# Patient Record
Sex: FEMALE | Race: OTHER | HISPANIC OR LATINO | URBAN - METROPOLITAN AREA
[De-identification: names, ages, dates, MRNs, and addresses within clinical notes are randomized per-mention and may not be internally consistent; named-entity substitution may affect disease eponyms.]

---

## 2023-12-28 ENCOUNTER — CLINICAL SUPPORT (OUTPATIENT)
Dept: REHABILITATION | Facility: OTHER | Age: 48
End: 2023-12-28
Payer: MEDICAID

## 2023-12-28 DIAGNOSIS — R29.898 SHOULDER WEAKNESS: Primary | ICD-10-CM

## 2023-12-28 DIAGNOSIS — R52 PAIN AGGRAVATED BY LIFTING: ICD-10-CM

## 2023-12-28 DIAGNOSIS — M53.84 DECREASED ROM OF INTERVERTEBRAL DISCS OF THORACIC SPINE: ICD-10-CM

## 2023-12-28 PROCEDURE — 97112 NEUROMUSCULAR REEDUCATION: CPT | Mod: PN

## 2023-12-28 PROCEDURE — 97161 PT EVAL LOW COMPLEX 20 MIN: CPT | Mod: PN

## 2023-12-28 NOTE — PLAN OF CARE
[facility]  Physical Therapy Initial Evaluation    Date: 12/28/2023   Name: Yuly Chung  Clinic Number: [id number]    Therapy Diagnosis:   Encounter Diagnoses   Name Primary?    Shoulder weakness Yes    Pain aggravated by lifting     Decreased ROM of intervertebral discs of thoracic spine      Physician: Mary Jane Rendon, [facility]    Physician Orders: PT Eval and Treat   Medical Diagnosis from Referral: Left shoulder pain [M25.512]   Evaluation Date: 12/28/2023d  Authorization Period Expiration: 12/6/24  Plan of Care Expiration: 3/21/24  Visit # / Visits authorized: 1/ 1   FOTO Follow Up:   FOTO Follow UP:     Precautions: Standard    Time In: 3:40 PM   Time Out: 4:30 PM   Total Appointment Time (timed & untimed codes): 50  minutes    Subjective     Date of onset: August   History of current condition - Yuly reports: insidious onset of L shoulder pain that hurts at rest, raising arm and carrying items. Denies mechanical symptoms, radiating symptoms, weakness in that arm. Is R arm dominant. Has previously done some lifting and working out but not recently d/t L shoulder pain. 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     Falls: none noted     Imaging none on file     Prior Therapy: none noted   Exercise Routine/Sport Participation: none  Social History: unremarkable  Prior Level of Function: independent with ADL's  Current Level of Function: pain in L shoulder with ADL's     Pain:  Current 5/10, worst 10/10, best 2/10   Location: left shoulder  Description: Aching, Dull, and Sharp  Aggravating Factors: Night Time and Lifting  Easing Factors: rest    Pts goals: return to ADL's without L shoulder pain       Medical History:   No past medical history on file.    Surgical History:   Yuly Chung  has no past surgical history on file.    Medications:   Yuly currently has no medications in their medication list.    Allergies:   Review of patient's allergies  indicates:  Not on File     Objective     Posture: mild elevation L shoulder, normal scapular rotation     Shoulder Elevation: delayed setting L, pain at end range, limited upward rotation     Shoulder Active Range of Motion:   Shoulder Right Left   Flexion   170 180   Overhead Reach    T4 T1**   Behind the back Reach   T7 T12**   Scapula Upward Rotation WNL Insufficient       Shoulder Passive Range of Motion:   Shoulder Right Left   Flexion   180 170**   ER at 0   75 75   ER at 90   100 100**   IR   50 50       Strength:   Right Left   Scaption 5/5 3+/5   Shoulder ER at side 5/5 3+/5   Shoulder IR at side  5/5 4+/5   Deltoid 5/5 3+/5       Special Tests:   Right Left   Villalobos- Shawn - +   Neer's - +   Full Can - +   Posterior/Internal Impingement Sign - +      Right Left   Load & Shift - -   Sulcus Sign - -   Apprehension Test - -   Relocation Test  - -      Right Left   Drop Arm Test - -   ER Lag Sign - -   Bear Hug - -     Joint Mobility: normal posterior and inferior glide, no capsular pattern noted     Palpation: Not TTP biceps tendon, mild TTP supraspinatus tendon     Flexibility:   Post Cuff: R - ; L +   Lat: R - ; L -   Pec Minor: R - ; L +        CMS Impairment/Limitation/Restriction for FOTO Intake Survey    Therapist reviewed FOTO scores for Yuly Chung on 12/28/2023.   FOTO documents entered into EPIC - see Media section.    Limitation Score: see media%  Category: Mobility       Treatment     Treatment Time In: 4:05 PM   Treatment Time Out: 4:30 PM   Total Treatment time separate from Evaluation: 25 minutes     Yuly received the treatments listed below:      Neuromuscular re-education activities to improve: Balance, Coordination, Kinesthetic, Sense, and Proprioception for 25 minutes. The following activities were included:  ER Walkout YTB   IT Walkout YTB   Wall Slide  Landmine Press   Open Book       Home Exercises and Patient Education Provided     Education provided:   - Prognosis, activity  modification, goals for therapy, role of therapy for care, exercises/HEP    Written Home Exercises Provided: Yes .  Exercises were reviewed and Yuly was able to demonstrate them prior to the end of the session.   Pt received a written copy of exercises to perform at home. Yuly demonstrated good  understanding of the education provided.     See EMR under patient instructions for exercises given.     Assessment     Yuly is a 48 y.o. female referred to outpatient Physical Therapy with s/s consistent with L shoulder subacromial pain syndrome. She presents with decreased shoulder ROM, shoulder strength, decreased functional abilities. Pt will benefit from skilled outpatient Physical Therapy to address the deficits stated above and in the chart below, provide pt/family education, and to maximize pt's level of independence. Pt prognosis is Excellent.     Plan of care discussed with patient: Yes  Pt's spiritual, cultural and educational needs considered and patient is agreeable to the plan of care and goals as stated below:       Anticipated Barriers for therapy: none       Medical Necessity is demonstrated by the following  History  Co-morbidities and personal factors that may impact the plan of care Co-morbidities:   See PMH     Personal Factors:   no deficits     low   Examination  Body Structures and Functions, activity limitations and participation restrictions that may impact the plan of care Body Regions:   upper extremities    Body Systems:    ROM  strength    Participation Restrictions:   ADL's    Activity limitations:   Learning and applying knowledge  no deficits    General Tasks and Commands  no deficits    Communication  no deficits    Mobility  no deficits    Self care  no deficits    Domestic Life  no deficits    Interactions/Relationships  no deficits    Life Areas  no deficits    Community and Social Life  no deficits         low   Clinical Presentation stable and uncomplicated low   Decision Making/  Complexity Score: low     Goals:  Short Term Goals: 2-4 weeks  1. Pt will be compliant with HEP 50% of prescribed amount.   2. The pt to demo improvement in L shoulder to equal to R pain free   3.  Pt will demo less than 2/10 pain at night in L shoulder    Long Term Goals: 8-12 weeks   Pt will be compliant with % of prescribed amount.   Patient will improve their FOTO limitation score to at least MCID as evidence of clinically significant improvements in their function.  Pt will demo 5/5 RC strength on MMT on L   Pt will demo ability to lift 20# overhead to demonstrate improved functional movement tolerance   The pt will report full participation in ADLs and IADLs without restrictions related to L shoulder.     Plan   Plan of care Certification: 12/28/2023 to 3/21/24.    Outpatient Physical Therapy 1-2 times weekly for 12 weeks to include the following interventions: Manual Therapy, Neuromuscular Re-ed, Patient Education, Therapeutic Activities, and Therapeutic Exercise.     Michael Barnett, PT , DPT

## 2024-01-03 ENCOUNTER — CLINICAL SUPPORT (OUTPATIENT)
Dept: REHABILITATION | Facility: OTHER | Age: 49
End: 2024-01-03
Payer: MEDICAID

## 2024-01-03 DIAGNOSIS — R29.898 SHOULDER WEAKNESS: Primary | ICD-10-CM

## 2024-01-03 DIAGNOSIS — R52 PAIN AGGRAVATED BY LIFTING: ICD-10-CM

## 2024-01-03 PROCEDURE — 97110 THERAPEUTIC EXERCISES: CPT | Mod: PN

## 2024-01-03 NOTE — PROGRESS NOTES
[facility]   Physical Therapy Treatment Note     Name: Yuly Chung  Clinic Number: [id number]    Therapy Diagnosis:   Encounter Diagnoses   Name Primary?    Shoulder weakness Yes    Pain aggravated by lifting      Physician: Mary Jane Rendon FNP-C    Visit Date: 1/3/2024    Physician Orders: PT Eval and Treat   Medical Diagnosis from Referral: Left shoulder pain [M25.512]   Evaluation Date: 12/28/2023d  Authorization Period Expiration: 12/6/24  Plan of Care Expiration: 3/21/24  Visit # / Visits authorized: 1 / 20   FOTO Follow Up  FOTO Follow UP:      Precautions: Standard     Time In: 12:40 PM   Time Out: 1:30 PM   Total Appointment Time (timed & untimed codes): 50  minutes    FOTO 1st:   FOTO 3rd:  FOTO 10th:      SUBJECTIVE     Pt reports: shoulder feels a bit better, more achy at night, otherwise pain is off and on.    She was compliant with home exercise program.  Response to previous treatment: too early   Functional change: too early     Pain: 4/10  Location: left shoulder      OBJECTIVE     Objective Measures updated at progress report unless specified.     Treatment       Yuly received the treatments listed below:      Therapeutic exercises to develop strength, endurance, ROM, and flexibility for 45 minutes including:  Supine Cane Flexion x 30   Sidelying Open book 2 x 10 per direction   ER Walkout YTB 3 x 10   IR Walkout YTB 3 x 10   ER Raise 3 x 10   Wall Slide x 30   Standing I RTB 3 x 10   Sidelying ER 0# 3 x 20     Manual therapy techniques: Joint mobilizations were applied to the: L shoulder for 15 minutes, including:  Posterior glide grade 1-2   Inferior glide grade 1-2   Lat MET   Supine Thoracic HVLA   Assessment         Patient Education and Home Exercises     Home Exercises Provided and Patient Education Provided     Education provided:   - cuff facilitation     Written Home Exercises Provided: Patient instructed to cont prior HEP. Exercises were reviewed and Yuly was  able to demonstrate them prior to the end of the session.  Yuly demonstrated good  understanding of the education provided. See EMR under Patient Instructions for exercises provided during therapy sessions    ASSESSMENT     Yuly with similar presentation to last session, still with cuff weakness and mild lat tightness that improves with exercises. Plan to progress periscapular strength and rotator cuff facilitation as tolerated.     Yuly Is progressing well towards her goals.     Pt prognosis is Good.     Pt will continue to benefit from skilled outpatient physical therapy to address the deficits listed in the problem list box on initial evaluation, provide pt/family education and to maximize pt's level of independence in the home and community environment.     Pt's spiritual, cultural and educational needs considered and pt agreeable to plan of care and goals.     Anticipated barriers to physical therapy: none     Goals:   Short Term Goals: 2-4 weeks  1. Pt will be compliant with HEP 50% of prescribed amount.   2. The pt to demo improvement in L shoulder to equal to R pain free   3.  Pt will demo less than 2/10 pain at night in L shoulder     Long Term Goals: 8-12 weeks   Pt will be compliant with % of prescribed amount.   Patient will improve their FOTO limitation score to at least MCID as evidence of clinically significant improvements in their function.  Pt will demo 5/5 RC strength on MMT on L   Pt will demo ability to lift 20# overhead to demonstrate improved functional movement tolerance   The pt will report full participation in ADLs and IADLs without restrictions related to L shoulder.     PLAN     Improve scap and RC strength towards full return to function     Michael Barnett, PT PT, DPT       MVC

## 2024-01-19 ENCOUNTER — CLINICAL SUPPORT (OUTPATIENT)
Dept: REHABILITATION | Facility: OTHER | Age: 49
End: 2024-01-19
Payer: MEDICAID

## 2024-01-19 DIAGNOSIS — R29.898 SHOULDER WEAKNESS: Primary | ICD-10-CM

## 2024-01-19 DIAGNOSIS — R52 PAIN AGGRAVATED BY LIFTING: ICD-10-CM

## 2024-01-19 PROCEDURE — 97110 THERAPEUTIC EXERCISES: CPT | Mod: PN

## 2024-01-22 NOTE — PROGRESS NOTES
[facility]   Physical Therapy Treatment Note     Name: Yuly Chung  Clinic Number: [id number]    Therapy Diagnosis:   Encounter Diagnoses   Name Primary?    Shoulder weakness Yes    Pain aggravated by lifting      Physician: Mary Jane Rendon FNP-C    Visit Date: 1/19/2024    Physician Orders: PT Eval and Treat   Medical Diagnosis from Referral: Left shoulder pain [M25.512]   Evaluation Date: 12/28/2023d  Authorization Period Expiration: 12/6/24  Plan of Care Expiration: 3/21/24  Visit # / Visits authorized: 1 / 20   FOTO Follow Up  FOTO Follow UP:      Precautions: Standard     Time In: 11:00 AM   Time Out: 12:00 PM    Total Appointment Time (timed & untimed codes): 60  minutes    FOTO 1st:   FOTO 3rd:  FOTO 10th:      SUBJECTIVE     Pt reports: shoulder is hurting a lot today, notes that she feels most pain when she puts backpack on over L shoulder.     She was compliant with home exercise program.  Response to previous treatment: too early   Functional change: too early     Pain: 4/10  Location: left shoulder      OBJECTIVE     Objective Measures updated at progress report unless specified.     Treatment       Yuly received the treatments listed below:      Therapeutic exercises to develop strength, endurance, ROM, and flexibility for 45 minutes including:  LLLD Inferior prop + heat x 5:00   ER isometric against wall 10 x :10   IR isometric against wall 10 x :10   Landmine press 3 x 15 @ 3#   Supine Cane Flexion x 30   Wall slide x 30     Manual therapy techniques: Joint mobilizations were applied to the: L shoulder for 15 minutes, including:  Posterior glide grade 1-2   Inferior glide grade 1-2   Lat MET   Supine Thoracic HVLA   Assessment         Patient Education and Home Exercises     Home Exercises Provided and Patient Education Provided     Education provided:   - cuff facilitation     Written Home Exercises Provided: Patient instructed to cont prior HEP. Exercises were reviewed  and Yuly was able to demonstrate them prior to the end of the session.  Yuly demonstrated good  understanding of the education provided. See EMR under Patient Instructions for exercises provided during therapy sessions    ASSESSMENT     Yuly with similar presentation to last session, still with cuff weakness and mild lat tightness that improves with exercises. Plan to progress periscapular strength and rotator cuff facilitation as tolerated.     Yuly Is progressing well towards her goals.     Pt prognosis is Good.     Pt will continue to benefit from skilled outpatient physical therapy to address the deficits listed in the problem list box on initial evaluation, provide pt/family education and to maximize pt's level of independence in the home and community environment.     Pt's spiritual, cultural and educational needs considered and pt agreeable to plan of care and goals.     Anticipated barriers to physical therapy: none     Goals:   Short Term Goals: 2-4 weeks  1. Pt will be compliant with HEP 50% of prescribed amount.   2. The pt to demo improvement in L shoulder to equal to R pain free   3.  Pt will demo less than 2/10 pain at night in L shoulder     Long Term Goals: 8-12 weeks   Pt will be compliant with % of prescribed amount.   Patient will improve their FOTO limitation score to at least MCID as evidence of clinically significant improvements in their function.  Pt will demo 5/5 RC strength on MMT on L   Pt will demo ability to lift 20# overhead to demonstrate improved functional movement tolerance   The pt will report full participation in ADLs and IADLs without restrictions related to L shoulder.     PLAN     Improve scap and RC strength towards full return to function     Michael Barnett, PT PT, DPT

## 2024-01-24 ENCOUNTER — CLINICAL SUPPORT (OUTPATIENT)
Dept: REHABILITATION | Facility: OTHER | Age: 49
End: 2024-01-24
Payer: MEDICAID

## 2024-01-24 DIAGNOSIS — R52 PAIN AGGRAVATED BY LIFTING: ICD-10-CM

## 2024-01-24 DIAGNOSIS — R29.898 SHOULDER WEAKNESS: Primary | ICD-10-CM

## 2024-01-24 PROCEDURE — 97110 THERAPEUTIC EXERCISES: CPT | Mod: PN

## 2024-01-24 NOTE — PROGRESS NOTES
[facility]   Physical Therapy Treatment Note     Name: Yuly Chung  Clinic Number: [id number]    Therapy Diagnosis:   Encounter Diagnoses   Name Primary?    Shoulder weakness Yes    Pain aggravated by lifting      Physician: Mary Jane Rendon FNP-C    Visit Date: 1/24/2024    Physician Orders: PT Eval and Treat   Medical Diagnosis from Referral: Left shoulder pain [M25.512]   Evaluation Date: 12/28/2023d  Authorization Period Expiration: 12/6/24  Plan of Care Expiration: 3/21/24  Visit # / Visits authorized: 3 / 20   FOTO Follow Up  FOTO Follow UP:      Precautions: Standard     Time In: 11:00 AM   Time Out: 12:00 PM    Total Appointment Time (timed & untimed codes): 60  minutes    FOTO 1st:   FOTO 3rd:  FOTO 10th:      SUBJECTIVE     Pt reports: shoulder was feeling a lot better but then daughter pulled on her arm while she was stretching it and now it hurts more.     She was compliant with home exercise program.  Response to previous treatment: too early   Functional change: too early     Pain: 4/10  Location: left shoulder      OBJECTIVE     Objective Measures updated at progress report unless specified.     Treatment       Yuly received the treatments listed below:      Therapeutic exercises to develop strength, endurance, ROM, and flexibility for 45 minutes including:  ER isometric against wall 10 x :10   Supine Handcuff YTB 3 x 10   Standing I RTB 3 x 10   Sidelying ER x 30 @ 0#   Manual Isometrics ER/IR     Manual therapy techniques: Joint mobilizations were applied to the: L shoulder for 15 minutes, including:  Posterior glide grade 1-2   Inferior glide grade 1-2   Supine Thoracic HVLA   Assessment         Patient Education and Home Exercises     Home Exercises Provided and Patient Education Provided     Education provided:   - cuff facilitation     Written Home Exercises Provided: Patient instructed to cont prior HEP. Exercises were reviewed and Yuly was able to demonstrate them  prior to the end of the session.  Yuly demonstrated good  understanding of the education provided. See EMR under Patient Instructions for exercises provided during therapy sessions    ASSESSMENT     Yuly with similar presentation to last session, still with cuff weakness and mild lat tightness that improves with exercises. Plan to progress periscapular strength and rotator cuff facilitation as tolerated.     Yuly Is progressing well towards her goals.     Pt prognosis is Good.     Pt will continue to benefit from skilled outpatient physical therapy to address the deficits listed in the problem list box on initial evaluation, provide pt/family education and to maximize pt's level of independence in the home and community environment.     Pt's spiritual, cultural and educational needs considered and pt agreeable to plan of care and goals.     Anticipated barriers to physical therapy: none     Goals:   Short Term Goals: 2-4 weeks  1. Pt will be compliant with HEP 50% of prescribed amount.   2. The pt to demo improvement in L shoulder to equal to R pain free   3.  Pt will demo less than 2/10 pain at night in L shoulder     Long Term Goals: 8-12 weeks   Pt will be compliant with % of prescribed amount.   Patient will improve their FOTO limitation score to at least MCID as evidence of clinically significant improvements in their function.  Pt will demo 5/5 RC strength on MMT on L   Pt will demo ability to lift 20# overhead to demonstrate improved functional movement tolerance   The pt will report full participation in ADLs and IADLs without restrictions related to L shoulder.     PLAN     Improve scap and RC strength towards full return to function     Michael Barnett, PT PT, DPT

## 2024-02-07 ENCOUNTER — CLINICAL SUPPORT (OUTPATIENT)
Dept: REHABILITATION | Facility: OTHER | Age: 49
End: 2024-02-07
Payer: MEDICAID

## 2024-02-07 DIAGNOSIS — R52 PAIN AGGRAVATED BY LIFTING: ICD-10-CM

## 2024-02-07 DIAGNOSIS — R29.898 SHOULDER WEAKNESS: Primary | ICD-10-CM

## 2024-02-07 PROCEDURE — 97110 THERAPEUTIC EXERCISES: CPT | Mod: PN

## 2024-02-07 NOTE — PROGRESS NOTES
Health Maintenance Due   Topic Date Due   • Shingles Vaccine (2 of 3) 12/26/2011   • Medicare Wellness Visit  12/04/2020       Patient is due for the topics as listed above and wishes to proceed with them. Pt to check with the pharmacy for the shingles vaccine        Patient would like communication of their results via:      Eliezer       [facility]   Physical Therapy Treatment Note     Name: Yuly Chung  Clinic Number: [id number]    Therapy Diagnosis:   Encounter Diagnoses   Name Primary?    Shoulder weakness Yes    Pain aggravated by lifting      Physician: Mary Jane Rendon FNP-C    Visit Date: 2/7/2024    Physician Orders: PT Eval and Treat   Medical Diagnosis from Referral: Left shoulder pain [M25.512]   Evaluation Date: 12/28/2023d  Authorization Period Expiration: 12/6/24  Plan of Care Expiration: 3/21/24  Visit # / Visits authorized: 3 / 20   FOTO Follow Up  FOTO Follow UP:      Precautions: Standard     Time In: 11:00 AM   Time Out: 12:00 PM    Total Appointment Time (timed & untimed codes): 60  minutes    FOTO 1st:   FOTO 3rd:  FOTO 10th:      SUBJECTIVE     Pt reports: shoulder was feeling a lot better but then daughter pulled on her arm while she was stretching it and now it hurts more.     She was compliant with home exercise program.  Response to previous treatment: too early   Functional change: too early     Pain: 4/10  Location: left shoulder      OBJECTIVE     Objective Measures updated at progress report unless specified.     Treatment       Yuly received the treatments listed below:      Therapeutic exercises to develop strength, endurance, ROM, and flexibility for 45 minutes including:  Nerve glides arm supported  No weight no money's  Landmine   Seated thoracic rotation   Seated CTJ extension   UT L3    Manual therapy techniques: Joint mobilizations were applied to the: L shoulder for 15 minutes, including:  Posterior glide grade 1-2   Inferior glide grade 1-2   Supine Thoracic HVLA   Assessment         Patient Education and Home Exercises     Home Exercises Provided and Patient Education Provided     Education provided:   - cuff facilitation     Written Home Exercises Provided: Patient instructed to cont prior HEP. Exercises were reviewed and Yuly was able to demonstrate them prior to the end of the  session.  Yuly demonstrated good  understanding of the education provided. See EMR under Patient Instructions for exercises provided during therapy sessions    ASSESSMENT     Yuly with irritability likely related to ANTT/brachial plexus irritation/TOS. Mild improvement in symptoms with cervical and thoracic manual and nerve glides.     Yuly Is progressing well towards her goals.     Pt prognosis is Good.     Pt will continue to benefit from skilled outpatient physical therapy to address the deficits listed in the problem list box on initial evaluation, provide pt/family education and to maximize pt's level of independence in the home and community environment.     Pt's spiritual, cultural and educational needs considered and pt agreeable to plan of care and goals.     Anticipated barriers to physical therapy: none     Goals:   Short Term Goals: 2-4 weeks  1. Pt will be compliant with HEP 50% of prescribed amount.   2. The pt to demo improvement in L shoulder to equal to R pain free   3.  Pt will demo less than 2/10 pain at night in L shoulder     Long Term Goals: 8-12 weeks   Pt will be compliant with % of prescribed amount.   Patient will improve their FOTO limitation score to at least MCID as evidence of clinically significant improvements in their function.  Pt will demo 5/5 RC strength on MMT on L   Pt will demo ability to lift 20# overhead to demonstrate improved functional movement tolerance   The pt will report full participation in ADLs and IADLs without restrictions related to L shoulder.     PLAN     Improve scap and RC strength towards full return to function     Michael Barnett, PT PT, DPT

## 2024-02-14 ENCOUNTER — CLINICAL SUPPORT (OUTPATIENT)
Dept: REHABILITATION | Facility: OTHER | Age: 49
End: 2024-02-14
Payer: MEDICAID

## 2024-02-14 DIAGNOSIS — R52 PAIN AGGRAVATED BY LIFTING: ICD-10-CM

## 2024-02-14 DIAGNOSIS — R29.898 SHOULDER WEAKNESS: Primary | ICD-10-CM

## 2024-02-14 PROCEDURE — 97110 THERAPEUTIC EXERCISES: CPT | Mod: PN

## 2024-02-14 NOTE — PROGRESS NOTES
[facility]   Physical Therapy Treatment Note     Name: Yuly Chung  Clinic Number: [id number]    Therapy Diagnosis:   Encounter Diagnoses   Name Primary?    Shoulder weakness Yes    Pain aggravated by lifting      Physician: Mary Jane Rendon FNP-C    Visit Date: 2/14/2024    Physician Orders: PT Eval and Treat   Medical Diagnosis from Referral: Left shoulder pain [M25.512]   Evaluation Date: 12/28/2023d  Authorization Period Expiration: 12/6/24  Plan of Care Expiration: 3/21/24  Visit # / Visits authorized: 5 / 20   FOTO Follow Up  FOTO Follow UP:      Precautions: Standard     Time In: 11:10 AM   Time Out: 12:00 PM    Total Appointment Time (timed & untimed codes): 50  minutes    FOTO 1st:   FOTO 3rd:  FOTO 10th:      SUBJECTIVE     Pt reports: shoulder continues to be highly irritable.     She was compliant with home exercise program.  Response to previous treatment: too early   Functional change: too early     Pain: 4/10  Location: left shoulder      OBJECTIVE     Objective Measures updated at progress report unless specified.     Treatment       Yuly received the treatments listed below:      Therapeutic exercises to develop strength, endurance, ROM, and flexibility for 35 minutes including:  Pulley's x 5'   ER Walkout YTB 3 x 10   IR Walkout YTB 3 x 10   Standing I YTB 3 x 10   Pt education     Manual therapy techniques: Joint mobilizations were applied to the: L shoulder for 15 minutes, including:  Posterior glide grade 1-2   Inferior glide grade 1-2   Supine Thoracic HVLA   Assessment         Patient Education and Home Exercises     Home Exercises Provided and Patient Education Provided     Education provided:   - cuff facilitation     Written Home Exercises Provided: Patient instructed to cont prior HEP. Exercises were reviewed and Yuly was able to demonstrate them prior to the end of the session.  Yuly demonstrated good  understanding of the education provided. See EMR under  Patient Instructions for exercises provided during therapy sessions    ASSESSMENT     Yuly with reports of relatively high pain but inconsistent with physical assessment. No significant cervical spine deficits, thoracic spine ROM deficits, ANTT today but continued pain with all motions. Discussed exercising within pain tolerance of 3/10 or below to improve cuff strength and shoulder capacity.    Yuly Is progressing well towards her goals.     Pt prognosis is Good.     Pt will continue to benefit from skilled outpatient physical therapy to address the deficits listed in the problem list box on initial evaluation, provide pt/family education and to maximize pt's level of independence in the home and community environment.     Pt's spiritual, cultural and educational needs considered and pt agreeable to plan of care and goals.     Anticipated barriers to physical therapy: none     Goals:   Short Term Goals: 2-4 weeks  1. Pt will be compliant with HEP 50% of prescribed amount.   2. The pt to demo improvement in L shoulder to equal to R pain free   3.  Pt will demo less than 2/10 pain at night in L shoulder     Long Term Goals: 8-12 weeks   Pt will be compliant with % of prescribed amount.   Patient will improve their FOTO limitation score to at least MCID as evidence of clinically significant improvements in their function.  Pt will demo 5/5 RC strength on MMT on L   Pt will demo ability to lift 20# overhead to demonstrate improved functional movement tolerance   The pt will report full participation in ADLs and IADLs without restrictions related to L shoulder.     PLAN     Improve scap and RC strength towards full return to function     Michael Barnett, PT PT, DPT

## 2024-02-21 ENCOUNTER — CLINICAL SUPPORT (OUTPATIENT)
Dept: REHABILITATION | Facility: OTHER | Age: 49
End: 2024-02-21
Payer: MEDICAID

## 2024-02-21 DIAGNOSIS — R52 PAIN AGGRAVATED BY LIFTING: ICD-10-CM

## 2024-02-21 DIAGNOSIS — R29.898 SHOULDER WEAKNESS: Primary | ICD-10-CM

## 2024-02-21 PROCEDURE — 97110 THERAPEUTIC EXERCISES: CPT | Mod: PN

## 2024-02-21 NOTE — PROGRESS NOTES
[facility]   Physical Therapy Treatment Note     Name: Yuly Chung  Clinic Number: [id number]    Therapy Diagnosis:   Encounter Diagnoses   Name Primary?    Shoulder weakness Yes    Pain aggravated by lifting      Physician: Mary Jane Rendon FNP-C    Visit Date: 2/21/2024    Physician Orders: PT Eval and Treat   Medical Diagnosis from Referral: Left shoulder pain [M25.512]   Evaluation Date: 12/28/2023d  Authorization Period Expiration: 12/6/24  Plan of Care Expiration: 3/21/24  Visit # / Visits authorized: 6 / 20   FOTO Follow Up  FOTO Follow UP:      Precautions: Standard     Time In: 12:40 PM   Time Out: 1:30 PM    Total Appointment Time (timed & untimed codes): 50  minutes    FOTO 1st:   FOTO 3rd:  FOTO 10th:      SUBJECTIVE     Pt reports: shoulder pain seems to be radiating more towards her shoulder.     She was compliant with home exercise program.  Response to previous treatment: too early   Functional change: too early     Pain: 4/10  Location: left shoulder      OBJECTIVE     Objective Measures updated at progress report unless specified.     Treatment       Yuly received the treatments listed below:      Therapeutic exercises to develop strength, endurance, ROM, and flexibility for 35 minutes including:  Wall thoracic rotation 3 x 10 per direction   Supine rhythmic stabs ER side and 90 abduction   ER Raise 3 x 8   ER Walkout YTB 3 x 10   IR Walkout YTB 3 x 10   2# single DB Wall slide 3 x 10 cue for ER   Standing Row RTB 3 x 15   Sidelying ER @ 0# 3 x 10   Pt education   UBE x 3'/3' for functional endurance     Manual therapy techniques: Joint mobilizations were applied to the: L shoulder for 15 minutes, including:  Posterior glide grade 1-2   Inferior glide grade 1-2   Supine Thoracic HVLA   ACJ flexion MWM grade 2-3   Assessment         Patient Education and Home Exercises     Home Exercises Provided and Patient Education Provided     Education provided:   - cuff facilitation      Written Home Exercises Provided: Patient instructed to cont prior HEP. Exercises were reviewed and Yuly was able to demonstrate them prior to the end of the session.  Yuly demonstrated good  understanding of the education provided. See EMR under Patient Instructions for exercises provided during therapy sessions    ASSESSMENT     Yuly with reports of relatively high pain but inconsistent with physical assessment. Focusing on increasing envelope of function before pain rather than focusing on complete elimination of pain. Education to continue working within 2-3/10 pain intensity during exercises but not above.     Yuly Is progressing well towards her goals.     Pt prognosis is Good.     Pt will continue to benefit from skilled outpatient physical therapy to address the deficits listed in the problem list box on initial evaluation, provide pt/family education and to maximize pt's level of independence in the home and community environment.     Pt's spiritual, cultural and educational needs considered and pt agreeable to plan of care and goals.     Anticipated barriers to physical therapy: none     Goals:   Short Term Goals: 2-4 weeks  1. Pt will be compliant with HEP 50% of prescribed amount.   2. The pt to demo improvement in L shoulder to equal to R pain free   3.  Pt will demo less than 2/10 pain at night in L shoulder     Long Term Goals: 8-12 weeks   Pt will be compliant with % of prescribed amount.   Patient will improve their FOTO limitation score to at least MCID as evidence of clinically significant improvements in their function.  Pt will demo 5/5 RC strength on MMT on L   Pt will demo ability to lift 20# overhead to demonstrate improved functional movement tolerance   The pt will report full participation in ADLs and IADLs without restrictions related to L shoulder.     PLAN     Improve scap and RC strength towards full return to function     Michael Barnett, PT PT, DPT

## 2024-02-28 ENCOUNTER — CLINICAL SUPPORT (OUTPATIENT)
Dept: REHABILITATION | Facility: OTHER | Age: 49
End: 2024-02-28
Payer: MEDICAID

## 2024-02-28 DIAGNOSIS — R29.898 SHOULDER WEAKNESS: Primary | ICD-10-CM

## 2024-02-28 DIAGNOSIS — R52 PAIN AGGRAVATED BY LIFTING: ICD-10-CM

## 2024-02-28 PROCEDURE — 97110 THERAPEUTIC EXERCISES: CPT | Mod: PN

## 2024-02-28 NOTE — PROGRESS NOTES
[facility]   Physical Therapy Treatment Note     Name: Yuly Chung  Clinic Number: [id number]    Therapy Diagnosis:   Encounter Diagnoses   Name Primary?    Shoulder weakness Yes    Pain aggravated by lifting      Physician: Mary Jane Rendon FNP-C    Visit Date: 2/28/2024    Physician Orders: PT Eval and Treat   Medical Diagnosis from Referral: Left shoulder pain [M25.512]   Evaluation Date: 12/28/2023d  Authorization Period Expiration: 12/6/24  Plan of Care Expiration: 3/21/24  Visit # / Visits authorized: 7 / 20   FOTO Follow Up  FOTO Follow UP:      Precautions: Standard     Time In: 11:06 AM   Time Out: 12:00 PM    Total Appointment Time (timed & untimed codes): 54  minutes    FOTO 1st:   FOTO 3rd:  FOTO 10th:      SUBJECTIVE     Pt reports: has been doing her HEP, feels good other than ER raises. Wants to know some leg exercises because her knees have been bothering her a bit when she goes up and down stairs.     She was compliant with home exercise program.  Response to previous treatment: too early   Functional change: too early     Pain: 4/10  Location: left shoulder      OBJECTIVE     Objective Measures updated at progress report unless specified.     Treatment       Yuly received the treatments listed below:      Therapeutic exercises to develop strength, endurance, ROM, and flexibility for 44 minutes including:  SLR x 20 b/l   Bridge x 20   Squat YTB 2 x 10   Pt education   UBE x 3'/3' for functional endurance   Handcuff scaption YTB 3 x 8   SA Hand Heel Rock 3 x 12   Wall Clock YTB 3 x per side   CKC ER drill 3 x 12   Modified downward dog 3 x 12   TRX Row 3 x 10     NP  Wall thoracic rotation 3 x 10 per direction   Supine rhythmic stabs ER side and 90 abduction   ER Raise 3 x 8   ER Walkout YTB 3 x 10   IR Walkout YTB 3 x 10   2# single DB Wall slide 3 x 10 cue for ER   Standing Row RTB 3 x 15   Sidelying ER @ 0# 3 x 10       Manual therapy techniques: Joint mobilizations were  applied to the: L shoulder for 10 minutes, including:  Posterior glide grade 1-2   Inferior glide grade 1-2   Supine Thoracic HVLA   ACJ flexion MWM grade 2-3   Assessment         Patient Education and Home Exercises     Home Exercises Provided and Patient Education Provided     Education provided:   - cuff facilitation     Written Home Exercises Provided: Patient instructed to cont prior HEP. Exercises were reviewed and Yuly was able to demonstrate them prior to the end of the session.  Yuly demonstrated good  understanding of the education provided. See EMR under Patient Instructions for exercises provided during therapy sessions    ASSESSMENT     Yuly with mild improvement in shoulder pain today. Notably better tolerance to CKC exercises than compared to [facility] shoulder exercises. Will plan to continue to progress CKC exercises as tolerated.     Yuly Is progressing well towards her goals.     Pt prognosis is Good.     Pt will continue to benefit from skilled outpatient physical therapy to address the deficits listed in the problem list box on initial evaluation, provide pt/family education and to maximize pt's level of independence in the home and community environment.     Pt's spiritual, cultural and educational needs considered and pt agreeable to plan of care and goals.     Anticipated barriers to physical therapy: none     Goals:   Short Term Goals: 2-4 weeks  1. Pt will be compliant with HEP 50% of prescribed amount.   2. The pt to demo improvement in L shoulder to equal to R pain free   3.  Pt will demo less than 2/10 pain at night in L shoulder     Long Term Goals: 8-12 weeks   Pt will be compliant with % of prescribed amount.   Patient will improve their FOTO limitation score to at least MCID as evidence of clinically significant improvements in their function.  Pt will demo 5/5 RC strength on MMT on L   Pt will demo ability to lift 20# overhead to demonstrate improved functional movement  tolerance   The pt will report full participation in ADLs and IADLs without restrictions related to L shoulder.     PLAN     Improve scap and RC strength towards full return to function     Michael Barnett, PT PT, DPT

## 2024-03-06 ENCOUNTER — CLINICAL SUPPORT (OUTPATIENT)
Dept: REHABILITATION | Facility: OTHER | Age: 49
End: 2024-03-06
Payer: MEDICAID

## 2024-03-06 DIAGNOSIS — R52 PAIN AGGRAVATED BY LIFTING: ICD-10-CM

## 2024-03-06 DIAGNOSIS — R29.898 SHOULDER WEAKNESS: Primary | ICD-10-CM

## 2024-03-06 PROCEDURE — 97110 THERAPEUTIC EXERCISES: CPT | Mod: PN

## 2024-03-06 NOTE — PROGRESS NOTES
[facility]   Physical Therapy Treatment Note     Name: Yuly Chung  Clinic Number: [id number]    Therapy Diagnosis:   Encounter Diagnoses   Name Primary?    Shoulder weakness Yes    Pain aggravated by lifting      Physician: Mary Jane Rendon FNP-C    Visit Date: 3/6/2024    Physician Orders: PT Eval and Treat   Medical Diagnosis from Referral: Left shoulder pain [M25.512]   Evaluation Date: 12/28/2023d  Authorization Period Expiration: 12/6/24  Plan of Care Expiration: 3/21/24  Visit # / Visits authorized: 8 / 20   FOTO Follow Up  FOTO Follow UP:      Precautions: Standard     Time In: 11:05 AM   Time Out: 12:00 PM    Total Appointment Time (timed & untimed codes): 54  minutes    FOTO 1st:   FOTO 3rd:  FOTO 10th:      SUBJECTIVE     Pt reports: has been doing her HEP, feels good other than ER raises. Wants to know some leg exercises because her knees have been bothering her a bit when she goes up and down stairs.     She was compliant with home exercise program.  Response to previous treatment: too early   Functional change: too early     Pain: 4/10  Location: left shoulder      OBJECTIVE     Objective Measures updated at progress report unless specified.     Treatment       Yuly received the treatments listed below:      Therapeutic exercises to develop strength, endurance, ROM, and flexibility for 45 minutes including:  Sidelying ER 3 x 10 @ 1#   No Money YTB 3 x 12   Hand Heel Rock 3 x 10   Shoulder Taps 3 x 20 alternating   Wall Slide YTB 3 x 10   Ball circles 3 x 10.10.10.10 both sides  Landmine 4# 3 x 15    Manual therapy techniques: Joint mobilizations were applied to the: L shoulder for 10 minutes, including:  FDN without stim to infraspinatus         Patient Education and Home Exercises     Home Exercises Provided and Patient Education Provided     Education provided:   - cuff facilitation     Written Home Exercises Provided: Patient instructed to cont prior HEP. Exercises were  reviewed and Yuly was able to demonstrate them prior to the end of the session.  Yuly demonstrated good  understanding of the education provided. See EMR under Patient Instructions for exercises provided during therapy sessions    ASSESSMENT     Yuly with mild improvement in shoulder pain today. Notably better tolerance to CKC exercises than compared to [facility] shoulder exercises. Will plan to continue to progress CKC exercises as tolerated.     Yuly Is progressing well towards her goals.     Pt prognosis is Good.     Pt will continue to benefit from skilled outpatient physical therapy to address the deficits listed in the problem list box on initial evaluation, provide pt/family education and to maximize pt's level of independence in the home and community environment.     Pt's spiritual, cultural and educational needs considered and pt agreeable to plan of care and goals.     Anticipated barriers to physical therapy: none     Goals:   Short Term Goals: 2-4 weeks  1. Pt will be compliant with HEP 50% of prescribed amount.   2. The pt to demo improvement in L shoulder to equal to R pain free   3.  Pt will demo less than 2/10 pain at night in L shoulder     Long Term Goals: 8-12 weeks   Pt will be compliant with % of prescribed amount.   Patient will improve their FOTO limitation score to at least MCID as evidence of clinically significant improvements in their function.  Pt will demo 5/5 RC strength on MMT on L   Pt will demo ability to lift 20# overhead to demonstrate improved functional movement tolerance   The pt will report full participation in ADLs and IADLs without restrictions related to L shoulder.     PLAN     Improve scap and RC strength towards full return to function     Michael Barnett, PT PT, DPT

## 2024-03-13 ENCOUNTER — CLINICAL SUPPORT (OUTPATIENT)
Dept: REHABILITATION | Facility: OTHER | Age: 49
End: 2024-03-13
Payer: MEDICAID

## 2024-03-13 DIAGNOSIS — R52 PAIN AGGRAVATED BY LIFTING: ICD-10-CM

## 2024-03-13 DIAGNOSIS — R29.898 SHOULDER WEAKNESS: Primary | ICD-10-CM

## 2024-03-13 PROCEDURE — 97110 THERAPEUTIC EXERCISES: CPT | Mod: PN

## 2024-03-13 NOTE — PROGRESS NOTES
[facility]   Physical Therapy Treatment Note     Name: Yuly Chung  Clinic Number: [id number]    Therapy Diagnosis:   Encounter Diagnoses   Name Primary?    Shoulder weakness Yes    Pain aggravated by lifting      Physician: Mary Jane Rendon FNP-C    Visit Date: 3/13/2024    Physician Orders: PT Eval and Treat   Medical Diagnosis from Referral: Left shoulder pain [M25.512]   Evaluation Date: 12/28/2023d  Authorization Period Expiration: 12/6/24  Plan of Care Expiration: 4/15/24  Visit # / Visits authorized: 9 / 20   FOTO Follow Up  FOTO Follow UP:      Precautions: Standard     Time In: 11:15 AM   Time Out: 12:00 PM    Total Appointment Time (timed & untimed codes): 45 minutes    FOTO 1st:   FOTO 3rd:  FOTO 10th:      SUBJECTIVE     Pt reports: didn't feel notably better after needling last time but feels like she's improving overall.     She was compliant with home exercise program.  Response to previous treatment: too early   Functional change: too early     Pain: 4/10  Location: left shoulder      OBJECTIVE     Objective Measures updated at progress report unless specified.     Treatment       Yuly received the treatments listed below:      Therapeutic exercises to develop strength, endurance, ROM, and flexibility for 35 minutes including:    Landmine 4# 3 x 15  No Money RTB 3 x 12   Banded 's Carry 5 x LOT 3#   UBE x 3'/3' for functional endurance   Shoulder Taps 3 x 20 alternating   Hand Heel Rock 3 x 10   Thoracic extension + rotation x 25  Cervical SNAG x 20 per side     Manual therapy techniques: Joint mobilizations were applied to the: L shoulder for 10 minutes, including:  C5-6 HVLA   Supine Thoracic HVLA         Patient Education and Home Exercises     Home Exercises Provided and Patient Education Provided     Education provided:   - cuff facilitation     Written Home Exercises Provided: Patient instructed to cont prior HEP. Exercises were reviewed and Yuly was able to  demonstrate them prior to the end of the session.  Yuly demonstrated good  understanding of the education provided. See EMR under Patient Instructions for exercises provided during therapy sessions    ASSESSMENT     Yuly with mild improvement in shoulder pain today. Notably better tolerance to CKC exercises than compared to [facility] shoulder exercises. Will plan to continue to progress CKC exercises as tolerated.     Yuly Is progressing well towards her goals.     Pt prognosis is Good.     Pt will continue to benefit from skilled outpatient physical therapy to address the deficits listed in the problem list box on initial evaluation, provide pt/family education and to maximize pt's level of independence in the home and community environment.     Pt's spiritual, cultural and educational needs considered and pt agreeable to plan of care and goals.     Anticipated barriers to physical therapy: none     Goals:   Short Term Goals: 2-4 weeks  1. Pt will be compliant with HEP 50% of prescribed amount.   2. The pt to demo improvement in L shoulder to equal to R pain free   3.  Pt will demo less than 2/10 pain at night in L shoulder     Long Term Goals: 8-12 weeks   Pt will be compliant with % of prescribed amount.   Patient will improve their FOTO limitation score to at least MCID as evidence of clinically significant improvements in their function.  Pt will demo 5/5 RC strength on MMT on L   Pt will demo ability to lift 20# overhead to demonstrate improved functional movement tolerance   The pt will report full participation in ADLs and IADLs without restrictions related to L shoulder.     PLAN     Improve scap and RC strength towards full return to function     Michael Barnett, PT PT, DPT

## 2024-03-20 ENCOUNTER — CLINICAL SUPPORT (OUTPATIENT)
Dept: REHABILITATION | Facility: OTHER | Age: 49
End: 2024-03-20
Payer: MEDICAID

## 2024-03-20 DIAGNOSIS — R52 PAIN AGGRAVATED BY LIFTING: ICD-10-CM

## 2024-03-20 DIAGNOSIS — R29.898 SHOULDER WEAKNESS: Primary | ICD-10-CM

## 2024-03-20 PROCEDURE — 97110 THERAPEUTIC EXERCISES: CPT | Mod: PN

## 2024-03-20 NOTE — PROGRESS NOTES
[facility]   Physical Therapy Treatment Note     Name: Yuly Chung  Clinic Number: [id number]    Therapy Diagnosis:   Encounter Diagnoses   Name Primary?    Shoulder weakness Yes    Pain aggravated by lifting      Physician: Mary Jane Rendon FNP-C    Visit Date: 3/20/2024    Physician Orders: PT Eval and Treat   Medical Diagnosis from Referral: Left shoulder pain [M25.512]   Evaluation Date: 12/28/2023d  Authorization Period Expiration: 12/6/24  Plan of Care Expiration: 4/15/24  Visit # / Visits authorized: 10 / 20   FOTO Follow Up  FOTO Follow UP:      Precautions: Standard     Time In: 11:00 AM   Time Out: 11:55 AM    Total Appointment Time (timed & untimed codes): 55 minutes    FOTO 1st:   FOTO 3rd:  FOTO 10th:      SUBJECTIVE     Pt reports: didn't do as much HEP over the weekend so shoulder was a bit more sore.     She was compliant with home exercise program.  Response to previous treatment: too early   Functional change: too early     Pain: 4/10  Location: left shoulder      OBJECTIVE     Objective Measures updated at progress report unless specified.     Treatment       Yuly received the treatments listed below:      Therapeutic exercises to develop strength, endurance, ROM, and flexibility for 55 minutes including:    Landmine 5# 3 x 15  No Money RTB 3 x 12   OH Carry 5# 3 x LOT per side   UBE x 3'/3' for functional endurance   Shoulder Taps 3 x 20 per side   Hand Heel Rock 3 x 10   Thoracic rotation windmill YTB x 30 per side   Cervical SNAG x 20 per side   Sidelying ER 0# 3 x 15   Cat Cow x 30    Manual therapy techniques: Joint mobilizations were applied to the: L shoulder for 00 minutes, including:          Patient Education and Home Exercises     Home Exercises Provided and Patient Education Provided     Education provided:   - cuff facilitation     Written Home Exercises Provided: Patient instructed to cont prior HEP. Exercises were reviewed and Yuly was able to demonstrate  them prior to the end of the session.  Yuly demonstrated good  understanding of the education provided. See EMR under Patient Instructions for exercises provided during therapy sessions    ASSESSMENT     Yuly with mild improvement in shoulder pain today. Notably better tolerance to CKC exercises than compared to [facility] shoulder exercises. Will plan to continue to progress CKC exercises as tolerated.     Yuly Is progressing well towards her goals.     Pt prognosis is Good.     Pt will continue to benefit from skilled outpatient physical therapy to address the deficits listed in the problem list box on initial evaluation, provide pt/family education and to maximize pt's level of independence in the home and community environment.     Pt's spiritual, cultural and educational needs considered and pt agreeable to plan of care and goals.     Anticipated barriers to physical therapy: none     Goals:   Short Term Goals: 2-4 weeks  1. Pt will be compliant with HEP 50% of prescribed amount. MET   2. The pt to demo improvement in L shoulder to equal to R pain free MET   3.  Pt will demo less than 2/10 pain at night in L shoulder MET      Long Term Goals: 8-12 weeks   Pt will be compliant with % of prescribed amount. MET   Patient will improve their FOTO limitation score to at least MCID as evidence of clinically significant improvements in their function.progressing not met   Pt will demo 5/5 RC strength on MMT on L progressing not met   Pt will demo ability to lift 20# overhead to demonstrate improved functional movement tolerance progressing not met   The pt will report full participation in ADLs and IADLs without restrictions related to L shoulder. Progressing not met     PLAN     Improve scap and RC strength towards full return to function     Michael Barnett, PT PT, DPT

## 2024-04-03 ENCOUNTER — CLINICAL SUPPORT (OUTPATIENT)
Dept: REHABILITATION | Facility: OTHER | Age: 49
End: 2024-04-03
Payer: MEDICAID

## 2024-04-03 DIAGNOSIS — R29.898 SHOULDER WEAKNESS: Primary | ICD-10-CM

## 2024-04-03 DIAGNOSIS — R52 PAIN AGGRAVATED BY LIFTING: ICD-10-CM

## 2024-04-03 PROCEDURE — 97110 THERAPEUTIC EXERCISES: CPT | Mod: PN

## 2024-04-03 NOTE — PROGRESS NOTES
"[facility]   Physical Therapy Treatment Note     Name: Yuly Chung  Clinic Number: [id number]    Therapy Diagnosis:   Encounter Diagnoses   Name Primary?    Shoulder weakness Yes    Pain aggravated by lifting      Physician: Mary Jane Rendon FNP-C    Visit Date: 4/3/2024    Physician Orders: PT Eval and Treat   Medical Diagnosis from Referral: Left shoulder pain [M25.512]   Evaluation Date: 12/28/2023d  Authorization Period Expiration: 12/6/24  Plan of Care Expiration: 4/15/24  Visit # / Visits authorized: 10 / 20   FOTO Follow Up  FOTO Follow UP:      Precautions: Standard     Time In: 11:15 AM   Time Out: 12:00 PM    Total Appointment Time (timed & untimed codes): 45 minutes    FOTO 1st:   FOTO 3rd:  FOTO 10th:      SUBJECTIVE     Pt reports: shoulder feels better. Was sick over the past week so didn't do as much HEP     She was compliant with home exercise program.  Response to previous treatment: too early   Functional change: too early     Pain: 4/10  Location: left shoulder      OBJECTIVE     Objective Measures updated at progress report unless specified.     Treatment       Yuly received the treatments listed below:      Therapeutic exercises to develop strength, endurance, ROM, and flexibility for 45 minutes including:  UBE x 3'/3' for functional endurance   Supine ER YTB 3 x 10   Supine IR YTB 3 x 10   Prone T 3 x 15 @ 0#   Quad extension + rotation x 30 per side   Hand heel rock x 30   OH Carry 5# 3 x LOT per side   Shoulder Taps 3 x 20 per side @ 18"     NP  Landmine 5# 3 x 15  No Money RTB 3 x 12   Hand Heel Rock 3 x 10   Thoracic rotation windmill YTB x 30 per side   Cervical SNAG x 20 per side   Sidelying ER 0# 3 x 15   Cat Cow x 30    Manual therapy techniques: Joint mobilizations were applied to the: L shoulder for 00 minutes, including:          Patient Education and Home Exercises     Home Exercises Provided and Patient Education Provided     Education provided:   - cuff " facilitation     Written Home Exercises Provided: Patient instructed to cont prior HEP. Exercises were reviewed and Yuly was able to demonstrate them prior to the end of the session.  Yuly demonstrated good  understanding of the education provided. See EMR under Patient Instructions for exercises provided during therapy sessions    ASSESSMENT     Yuly with mild improvement in shoulder pain today. Notably better tolerance to CKC exercises than compared to [facility] shoulder exercises. Will plan to continue to progress CKC exercises as tolerated.     Yuyl Is progressing well towards her goals.     Pt prognosis is Good.     Pt will continue to benefit from skilled outpatient physical therapy to address the deficits listed in the problem list box on initial evaluation, provide pt/family education and to maximize pt's level of independence in the home and community environment.     Pt's spiritual, cultural and educational needs considered and pt agreeable to plan of care and goals.     Anticipated barriers to physical therapy: none     Goals:   Short Term Goals: 2-4 weeks  1. Pt will be compliant with HEP 50% of prescribed amount. MET   2. The pt to demo improvement in L shoulder to equal to R pain free MET   3.  Pt will demo less than 2/10 pain at night in L shoulder MET      Long Term Goals: 8-12 weeks   Pt will be compliant with % of prescribed amount. MET   Patient will improve their FOTO limitation score to at least MCID as evidence of clinically significant improvements in their function.progressing not met   Pt will demo 5/5 RC strength on MMT on L progressing not met   Pt will demo ability to lift 20# overhead to demonstrate improved functional movement tolerance progressing not met   The pt will report full participation in ADLs and IADLs without restrictions related to L shoulder. Progressing not met     PLAN     Improve scap and RC strength towards full return to function     Michael Barnett, PT,  DPT

## 2024-04-17 ENCOUNTER — CLINICAL SUPPORT (OUTPATIENT)
Dept: REHABILITATION | Facility: OTHER | Age: 49
End: 2024-04-17
Payer: MEDICAID

## 2024-04-17 DIAGNOSIS — R52 PAIN AGGRAVATED BY LIFTING: ICD-10-CM

## 2024-04-17 DIAGNOSIS — R29.898 SHOULDER WEAKNESS: Primary | ICD-10-CM

## 2024-04-17 PROCEDURE — 97110 THERAPEUTIC EXERCISES: CPT | Mod: PN

## 2024-04-17 NOTE — PROGRESS NOTES
"[facility]   Physical Therapy Treatment Note     Name: Yuly Chung  Clinic Number: [id number]    Therapy Diagnosis:   Encounter Diagnoses   Name Primary?    Shoulder weakness Yes    Pain aggravated by lifting      Physician: Mary Jane Rendon FNP-C    Visit Date: 4/17/2024    Physician Orders: PT Eval and Treat   Medical Diagnosis from Referral: Left shoulder pain [M25.512]   Evaluation Date: 12/28/2023d  Authorization Period Expiration: 12/6/24  Plan of Care Expiration: 4/15/24  Visit # / Visits authorized: 12 / 20   FOTO Follow Up  FOTO Follow UP:      Precautions: Standard     Time In: 11:15 AM   Time Out: 12:00 PM    Total Appointment Time (timed & untimed codes): 45 minutes    FOTO 1st:   FOTO 3rd:  FOTO 10th:      SUBJECTIVE     Pt reports: shoulder has been pain free for the past two weeks.     She was compliant with home exercise program.  Response to previous treatment: too early   Functional change: too early     Pain: 4/10  Location: left shoulder      OBJECTIVE     Objective Measures updated at progress report unless specified.     Treatment       Yuly received the treatments listed below:      Therapeutic exercises to develop strength, endurance, ROM, and flexibility for 45 minutes including:  UBE x 3'/3' for functional endurance   Supine ER YTB 3 x 10   Supine IR YTB 3 x 10   No Money YTB 3 x 12   Prone T 3 x 15 @ 0#   Quad extension + rotation x 30 per side   Hand heel rock x 30   OH Carry 5# 3 x LOT per side   Shoulder Taps 3 x 20 per side @ 18"   Sidelying ER 1# 3 x 10      Manual therapy techniques: Joint mobilizations were applied to the: L shoulder for 00 minutes, including:          Patient Education and Home Exercises     Home Exercises Provided and Patient Education Provided     Education provided:   - cuff facilitation     Written Home Exercises Provided: Patient instructed to cont prior HEP. Exercises were reviewed and Yuly was able to demonstrate them prior to the " end of the session.  Yuly demonstrated good  understanding of the education provided. See EMR under Patient Instructions for exercises provided during therapy sessions    ASSESSMENT     Yuly with no pain today beginning of session or throughout session. Improved tolerance to exercise and ADL's     Yuly Is progressing well towards her goals.     Pt prognosis is Good.     Pt will continue to benefit from skilled outpatient physical therapy to address the deficits listed in the problem list box on initial evaluation, provide pt/family education and to maximize pt's level of independence in the home and community environment.     Pt's spiritual, cultural and educational needs considered and pt agreeable to plan of care and goals.     Anticipated barriers to physical therapy: none     Goals:   Short Term Goals: 2-4 weeks  1. Pt will be compliant with HEP 50% of prescribed amount. MET   2. The pt to demo improvement in L shoulder to equal to R pain free MET   3.  Pt will demo less than 2/10 pain at night in L shoulder MET      Long Term Goals: 8-12 weeks   Pt will be compliant with % of prescribed amount. MET   Patient will improve their FOTO limitation score to at least MCID as evidence of clinically significant improvements in their function.progressing not met   Pt will demo 5/5 RC strength on MMT on L progressing not met   Pt will demo ability to lift 20# overhead to demonstrate improved functional movement tolerance progressing not met   The pt will report full participation in ADLs and IADLs without restrictions related to L shoulder. Progressing not met     PLAN     Improve scap and RC strength towards full return to function     Michael Barnett, PT, DPT

## 2024-04-24 ENCOUNTER — CLINICAL SUPPORT (OUTPATIENT)
Dept: REHABILITATION | Facility: OTHER | Age: 49
End: 2024-04-24
Payer: MEDICAID

## 2024-04-24 DIAGNOSIS — R52 PAIN AGGRAVATED BY LIFTING: ICD-10-CM

## 2024-04-24 DIAGNOSIS — R29.898 SHOULDER WEAKNESS: Primary | ICD-10-CM

## 2024-04-24 PROCEDURE — 97110 THERAPEUTIC EXERCISES: CPT | Mod: PN

## 2024-04-24 NOTE — PROGRESS NOTES
"[facility]   Physical Therapy Treatment Note     Name: Yuly Chung  Clinic Number: [id number]    Therapy Diagnosis:   Encounter Diagnoses   Name Primary?    Shoulder weakness Yes    Pain aggravated by lifting      Physician: Mary Jane Rendon FNP-C    Visit Date: 4/24/2024    Physician Orders: PT Eval and Treat   Medical Diagnosis from Referral: Left shoulder pain [M25.512]   Evaluation Date: 12/28/2023d  Authorization Period Expiration: 12/6/24  Plan of Care Expiration: 4/15/24  Visit # / Visits authorized: 12 / 20   FOTO Follow Up  FOTO Follow UP:      Precautions: Standard     Time In: 11:15 AM   Time Out: 12:00 PM    Total Appointment Time (timed & untimed codes): 45 minutes    FOTO 1st:   FOTO 3rd:  FOTO 10th:      SUBJECTIVE     Pt reports: shoulder has been pain free for the past two weeks.     She was compliant with home exercise program.  Response to previous treatment: too early   Functional change: too early     Pain: 4/10  Location: left shoulder      OBJECTIVE     Objective Measures updated at progress report unless specified.     Treatment       Yuly received the treatments listed below:      Therapeutic exercises to develop strength, endurance, ROM, and flexibility for 45 minutes including:  UBE x 3'/3' for functional endurance   ER walkout 3 x 10 YTB   IR Walkout 3 x 10 YTB   No Money YTB 3 x 12   Quad extension + rotation x 30 per side   Hand heel rock x 30   Shoulder Taps 3 x 20 per side @ 18"   Supine flexion cane x 30       Manual therapy techniques: Joint mobilizations were applied to the: L shoulder for 00 minutes, including:          Patient Education and Home Exercises     Home Exercises Provided and Patient Education Provided     Education provided:   - cuff facilitation     Written Home Exercises Provided: Patient instructed to cont prior HEP. Exercises were reviewed and Yuly was able to demonstrate them prior to the end of the session.  Yuly demonstrated good  " understanding of the education provided. See EMR under Patient Instructions for exercises provided during therapy sessions    ASSESSMENT     Yuly with no pain today beginning of session or throughout session. Improved tolerance to exercise and ADL's. D/c to HEP, discussed and reviewed all exercises.    Yuly Is progressing well towards her goals.     Pt prognosis is Good.     Pt will continue to benefit from skilled outpatient physical therapy to address the deficits listed in the problem list box on initial evaluation, provide pt/family education and to maximize pt's level of independence in the home and community environment.     Pt's spiritual, cultural and educational needs considered and pt agreeable to plan of care and goals.     Anticipated barriers to physical therapy: none     Goals:   Short Term Goals: 2-4 weeks  1. Pt will be compliant with HEP 50% of prescribed amount. MET   2. The pt to demo improvement in L shoulder to equal to R pain free MET   3.  Pt will demo less than 2/10 pain at night in L shoulder MET      Long Term Goals: 8-12 weeks   Pt will be compliant with % of prescribed amount. MET   Patient will improve their FOTO limitation score to at least MCID as evidence of clinically significant improvements in their function.progressing not met   Pt will demo 5/5 RC strength on MMT on L progressing not met   Pt will demo ability to lift 20# overhead to demonstrate improved functional movement tolerance progressing not met   The pt will report full participation in ADLs and IADLs without restrictions related to L shoulder. Progressing not met     PLAN     Improve scap and RC strength towards full return to function     Michael Barnett, PT, DPT